# Patient Record
Sex: FEMALE | Race: WHITE | NOT HISPANIC OR LATINO | URBAN - METROPOLITAN AREA
[De-identification: names, ages, dates, MRNs, and addresses within clinical notes are randomized per-mention and may not be internally consistent; named-entity substitution may affect disease eponyms.]

---

## 2018-02-09 ENCOUNTER — EMERGENCY (EMERGENCY)
Facility: HOSPITAL | Age: 58
LOS: 1 days | Discharge: HOME | End: 2018-02-09
Attending: EMERGENCY MEDICINE
Payer: COMMERCIAL

## 2018-02-09 VITALS
OXYGEN SATURATION: 97 % | SYSTOLIC BLOOD PRESSURE: 109 MMHG | HEART RATE: 89 BPM | DIASTOLIC BLOOD PRESSURE: 70 MMHG | RESPIRATION RATE: 18 BRPM | TEMPERATURE: 98 F

## 2018-02-09 PROCEDURE — 99283 EMERGENCY DEPT VISIT LOW MDM: CPT | Mod: 25

## 2018-02-09 PROCEDURE — 99284 EMERGENCY DEPT VISIT MOD MDM: CPT

## 2018-02-09 PROCEDURE — 73130 X-RAY EXAM OF HAND: CPT | Mod: 26,LT

## 2018-02-09 PROCEDURE — 73130 X-RAY EXAM OF HAND: CPT

## 2018-02-09 RX ORDER — OXYCODONE AND ACETAMINOPHEN 5; 325 MG/1; MG/1
1 TABLET ORAL ONCE
Qty: 0 | Refills: 0 | Status: DISCONTINUED | OUTPATIENT
Start: 2018-02-09 | End: 2018-02-09

## 2018-02-09 RX ORDER — IBUPROFEN 200 MG
1 TABLET ORAL
Qty: 20 | Refills: 0 | OUTPATIENT
Start: 2018-02-09 | End: 2018-02-13

## 2018-02-09 RX ADMIN — OXYCODONE AND ACETAMINOPHEN 1 TABLET(S): 5; 325 TABLET ORAL at 17:39

## 2018-02-09 RX ADMIN — Medication 100 MILLIGRAM(S): at 17:39

## 2018-02-09 NOTE — ED PROVIDER NOTE - ATTENDING CONTRIBUTION TO CARE
Agree with NP H&P.  58 y/o female here with left hand pain and swelling, Denies trauma.  + cellulitis on exam.  will check x-ray, and reassess.  Pt to return in 2 days for wound check.

## 2018-02-09 NOTE — ED PROVIDER NOTE - PHYSICAL EXAMINATION
L hand: full ROM, 2+ pulses, swelling, erythema/heat, streaking noted to mid anterior forearm, no lacerations/abrasions/ecchymosis  L wrist: full ROM

## 2018-02-09 NOTE — ED PROVIDER NOTE - OBJECTIVE STATEMENT
58 y/o F with no significant PMHx presents to ED w/ c/o pain and swelling in L hand radiating up to elbow, worsening x 5 days. Pt went to [facility] today, and was referred to ED w/ concern for infection. Pt had no imaging done. Complains of fever, chills and night sweats in this time as well. Pt has not taken any medication for her pain. Pt denies any bruising, deformities, or any other complaints. NKDA. 56 y/o F with no significant PMHx presents to ED w/ c/o pain and swelling in L hand radiating up to elbow, worsening x 5 days. Pt went to [facility] today, and was referred to ED w/ concern for infection. Pt had no imaging done. Denies fever or trauma.

## 2018-02-09 NOTE — ED PROVIDER NOTE - MEDICAL DECISION MAKING DETAILS
58 y/o F here w/ L hand pain and swelling, sent from [facility] w/ concern for infection. Will check x-ray, give medication & re-assess. Will likely d/c home w/ abx. Vital signs within normal limits 58 y/o F here w/ L hand pain and swelling, sent from [facility] w/ concern for infection. Will check x-ray, r/o osteo, most likely cellulitis & re-assess. Will likely d/c home w/ abx. Vital signs within normal limits 58 y/o F here w/ L hand pain and swelling, sent from [facility] w/ concern for infection. Will check x-ray, r/o osteo, most likely cellulitis & re-assess. Will likely d/c home w/ abx, marked site and return x48h for wound check with strict return instructions. Vital signs within normal limits 58 y/o F here w/ L hand pain and swelling, sent from [facility] w/ concern for infection. Vss afebrile, no antipyretics taken prior to arrival, no systemic symptoms. Will check x-ray, r/o osteo, most likely cellulitis & re-assess. Will likely d/c home w/ abx, marked site and return x48h for wound check with strict return instructions.

## 2018-02-09 NOTE — ED ADULT NURSE NOTE - OBJECTIVE STATEMENT
pt is a 56 y/o female with c/o left hand pain and  swelling since monday pt  went to urgent care and was referred to ED

## 2018-02-11 ENCOUNTER — EMERGENCY (EMERGENCY)
Facility: HOSPITAL | Age: 58
LOS: 1 days | Discharge: LEFT BEFORE DISCHARGE | End: 2018-02-11
Attending: EMERGENCY MEDICINE
Payer: COMMERCIAL

## 2018-02-11 VITALS
SYSTOLIC BLOOD PRESSURE: 111 MMHG | OXYGEN SATURATION: 99 % | HEIGHT: 62 IN | HEART RATE: 94 BPM | WEIGHT: 130.07 LBS | RESPIRATION RATE: 16 BRPM | DIASTOLIC BLOOD PRESSURE: 77 MMHG | TEMPERATURE: 98 F

## 2018-02-11 PROCEDURE — 99281 EMR DPT VST MAYX REQ PHY/QHP: CPT

## 2021-11-24 NOTE — ED ADULT TRIAGE NOTE - ESI TRIAGE ACUITY LEVEL, MLM
[FreeTextEntry1] : 36 yo female with hx of tremor, DM POLYNEUROPATHY, vulvodynia, clitoral neuropathy and headaches is here for follow up visit. She now reports better headache control now she is back on Elavil. She will follow with psychiatrist for anxiety\par \par \par - Cont Elavil 75 mg qd and indomethacin 25 mg prn for ha\par - Cont flexeril 10 mg prn for tremor\par - RTC in 6 months  4